# Patient Record
(demographics unavailable — no encounter records)

---

## 2024-10-08 NOTE — ASSESSMENT
[FreeTextEntry1] : 49 year old female with: 1) Fibromyalgia:  Previously w/ no relief with amitriptyline.  Didn't tolerate Lyrica or Cymbalta.  Now improved overall w/ exercise and gabapentin. - Reiterated importance of exercise, including stretching, light aerobics, and/or aqua therapy - Cont gabapentin 100mg qhs. - Cont cyclobenzaprine 10mg BID prn. - psychiatry f/u for depression 2) Right ankle pain: unclear etiology. x-rays unremarkable. Resolved. 3)  Left shoulder pain:  most c/w RTC tendinitis + trigger points   - Cont PT / reiterated importance of shoulder exercises   - Cont naproxen 500mg BID prn   - heating pads or ice packs   - OTC topical analgesics   - Refer for massage therapy.

## 2024-10-08 NOTE — PHYSICAL EXAM
[General Appearance - Alert] : alert [General Appearance - In No Acute Distress] : in no acute distress [General Appearance - Well Nourished] : well nourished [General Appearance - Well Developed] : well developed [Sclera] : the sclera and conjunctiva were normal [PERRL With Normal Accommodation] : pupils were equal in size, round, and reactive to light [Outer Ear] : the ears and nose were normal in appearance [Examination Of The Oral Cavity] : the lips and gums were normal [Oropharynx] : the oropharynx was normal [Neck Appearance] : the appearance of the neck was normal [Auscultation Breath Sounds / Voice Sounds] : lungs were clear to auscultation bilaterally [Heart Rate And Rhythm] : heart rate was normal and rhythm regular [Heart Sounds] : normal S1 and S2 [Heart Sounds Gallop] : no gallops [Murmurs] : no murmurs [Heart Sounds Pericardial Friction Rub] : no pericardial rub [Edema] : there was no peripheral edema [Bowel Sounds] : normal bowel sounds [Abdomen Soft] : soft [Abdomen Tenderness] : non-tender [Cervical Lymph Nodes Enlarged Posterior Bilaterally] : posterior cervical [Cervical Lymph Nodes Enlarged Anterior Bilaterally] : anterior cervical [Supraclavicular Lymph Nodes Enlarged Bilaterally] : supraclavicular [] : no rash [Skin Lesions] : no skin lesions [No Focal Deficits] : no focal deficits [Oriented To Time, Place, And Person] : oriented to person, place, and time [Abnormal Walk] : normal gait [Nail Clubbing] : no clubbing  or cyanosis of the fingernails [Involuntary Movements] : no involuntary movements were seen [Musculoskeletal - Swelling] : no joint swelling seen [Motor Tone] : muscle strength and tone were normal [FreeTextEntry1] : No synovitis, left shoulder w/ painful decreased abduction and internal rotation; normal ROM in rest of joints

## 2024-10-08 NOTE — HISTORY OF PRESENT ILLNESS
[FreeTextEntry1] : 10/8/2024:  Feeling "the same" overall.  Fibromyalgia remains stable on gabapentin.  Still w/ left shoulder pain - improved since she started "cupping". and using a hand massager and a TENS unit.  pt reports that she is undergoing cardiac w/u.  Otherwise, no new complaints. 6/11/2024:  Feeling much better overall.  Left shoulder pain much improved s/p C-S injection at last visit and w/ PT.  Pt didn't tolerate Lyrica, even after decreasing dose to 25mg qhs, so she D/'C'ed it and restarted gabapentin 100mg qhs.  No new complaints.  3/12/2024:  Still w/ severe left shoulder pain.  Also still w diffuse pain.  she tried increasing gabapentin to 300mg TID, but began to experience severe fatigue and brain fog..  No other new complaints.

## 2024-12-18 NOTE — HISTORY OF PRESENT ILLNESS
[FreeTextEntry1] : Follow up- medication refill  [de-identified] : 49 year old female presents for a follow up.   Has anxiety/depression she is currently on a medication regimen of Lexapro 20 mg daily, Clonazepam in the mornings when needed (taking 1/4 of 0.5 mg dose) will take Alprazolam daily- she has been taking 1/2 tablet in the day (sometimes twice daily), 1 tablet in the evenings (to help with her anxiety)  she reports having been tried on alternative medications which did not help she had been started on hormone replacement, she found this had worsened her anxiety, she stopped the treatment and is feeling better  she has been speaking to a therapist which has helped she requests a refill for Alprazolam   has fibromyalgia, polyarthralgia  off of Gabapentin  on Cyclobenzaprine (takes 5 mg) PRN  follows with rheumatology also follows with a chiropractor   has HTN, non-obstructive CAD follows with cardiology off of statin  continues on medications for HTN- on Amlodipine, Lisinopril, HCTZ   Asthmatic- takes Albuterol PRN  due for a mammogram- has a referral   current smoker- 1 pack per day

## 2024-12-18 NOTE — ASSESSMENT
[FreeTextEntry1] : Anxiety/Depression: c/w Lexapro c/w Alprazolam,  reviewed c/w Clonazepam PRN patient aware not to take Alprazolam and Clonazepam at the same time c/w therapy   Fibromyalgia: following with rheumatology following with a chiropractor off of Gabapentin c/w Cyclobenzaprine PRN  HTN: stable c/w Amlodipine, Lisinopril, HCTZ following with cardiology  Cigarette nicotine dependence: advised on nicotine/smoking cessation  Asthma: stable c/w Albuterol PRN  return as needed and f/u in 3 months

## 2025-03-10 NOTE — HISTORY OF PRESENT ILLNESS
[Home] : at home, [unfilled] , at the time of the visit. [Medical Office: ([facility])___] : at the medical office located in  [Telehealth (audio & video)] : This visit was provided via telehealth using real-time 2-way audio visual technology. [Verbal consent obtained from patient] : the patient, [unfilled] [FreeTextEntry1] : Follow up- medication refills  [de-identified] : 49 year old female presents via telehealth for a follow up for medication refills.   Has anxiety/depression she is currently on a medication regimen of Lexapro 20 mg daily, Clonazepam in the mornings when needed (taking 1/4 of 0.5 mg dose) will take Alprazolam daily- she has been taking 1/2 tablet in the day (sometimes twice daily), 1 tablet in the evenings (to help with her anxiety)  she reports having been tried on alternative medications which did not help she had been started on hormone replacement, she found this had worsened her anxiety, she stopped the treatment and is feeling better  she has been speaking to a therapist which has helped requests refills   has fibromyalgia, polyarthralgia  off of Gabapentin  on Cyclobenzaprine (takes 5 mg) PRN  follows with rheumatology also follows with a chiropractor   has HTN, non-obstructive CAD follows with cardiology off of statin  continues on medications for HTN- on Amlodipine, Lisinopril, HCTZ   Asthmatic- takes Albuterol PRN  current smoker- 1 pack per day   recently seen by her dentist- on Amoxicillin for an infection

## 2025-03-10 NOTE — ASSESSMENT
[FreeTextEntry1] : Anxiety/Depression: c/w Lexapro c/w Alprazolam,  reviewed c/w Clonazepam PRN patient aware not to take Alprazolam and Clonazepam at the same time c/w therapy   Fibromyalgia: following with rheumatology following with a chiropractor off of Gabapentin c/w Cyclobenzaprine PRN  HTN: previously stable c/w Amlodipine, Lisinopril, HCTZ following with cardiology  Cigarette nicotine dependence: advised on nicotine/smoking cessation  Asthma: stable c/w Albuterol PRN  return as needed and f/u in 3 months

## 2025-06-18 NOTE — ASSESSMENT
[FreeTextEntry1] : Anxiety/Depression: c/w Lexapro c/w Alprazolam,  reviewed c/w Clonazepam PRN patient aware not to take Alprazolam and Clonazepam at the same time c/w therapy   Fibromyalgia: following with rheumatology following with a chiropractor off of Gabapentin c/w Cyclobenzaprine PRN  HTN: stable c/w Amlodipine, Lisinopril, HCTZ following with cardiology  CAD: following with cardiology will check updated blood work   Asthma: stable c/w Albuterol PRN  HCM: check blood work referred for a mammogram   return as needed and f/u in 3 months (can be done as a telehealth appointment)

## 2025-06-18 NOTE — HISTORY OF PRESENT ILLNESS
[FreeTextEntry1] : Follow up  [de-identified] : 50 year old female presents for a follow up.   Has anxiety/depression she is currently on a medication regimen of Lexapro 20 mg daily, Clonazepam in the mornings when needed (taking 1/4 of 0.5 mg dose) will take Alprazolam daily- she has been taking 1/2 tablet in the day (sometimes twice daily), 1 tablet in the evenings (to help with her anxiety)  she reports having been tried on alternative medications which did not help requests medication refill for Alprazolam   has fibromyalgia, polyarthralgia  off of Gabapentin  on Cyclobenzaprine (takes 5 mg) PRN  follows with rheumatology also follows with a chiropractor   has HTN, non-obstructive CAD follows with cardiology off of statin  continues on medications for HTN- on Amlodipine, Lisinopril, HCTZ  due for updated blood work   Asthmatic- takes Albuterol PRN  current smoker- 1 pack per day   overdue for a mammogram